# Patient Record
Sex: MALE | Race: AMERICAN INDIAN OR ALASKA NATIVE
[De-identification: names, ages, dates, MRNs, and addresses within clinical notes are randomized per-mention and may not be internally consistent; named-entity substitution may affect disease eponyms.]

---

## 2021-04-27 ENCOUNTER — HOSPITAL ENCOUNTER (EMERGENCY)
Dept: HOSPITAL 5 - ED | Age: 26
Discharge: HOME | End: 2021-04-27
Payer: SELF-PAY

## 2021-04-27 VITALS — DIASTOLIC BLOOD PRESSURE: 69 MMHG | SYSTOLIC BLOOD PRESSURE: 106 MMHG

## 2021-04-27 DIAGNOSIS — J03.90: Primary | ICD-10-CM

## 2021-04-27 DIAGNOSIS — Z79.899: ICD-10-CM

## 2021-04-27 DIAGNOSIS — F17.200: ICD-10-CM

## 2021-04-27 PROCEDURE — 99282 EMERGENCY DEPT VISIT SF MDM: CPT

## 2021-04-27 NOTE — EMERGENCY DEPARTMENT REPORT
ED ENT HPI





- General


Chief complaint: Sore Throat


Stated complaint: DELANEY, SORE THROAT


Time Seen by Provider: 04/27/21 16:13


Source: patient


Mode of arrival: Ambulatory


Limitations: No Limitations





- History of Present Illness


Initial comments: 





Patient is a 25-year-old male who presents emergency room with complaints of a 

sore throat that began a week ago.  He states in the last 2 days it has become 

more uncomfortable and hurts to swallow.  He states he is able to tolerate p.o. 

intake.  He states that his tonsils feel swollen and he noticed some white 

spots.  He states he also has right ear pain.  He denies any fever, nausea, 

vomiting, diarrhea, ear drainage, hearing changes, shortness of breath.  Past 

medical history of HIV and reports he is undetectable.  No allergies to 

medications.  No known sick contacts.  No recent travel.





- Related Data


                                  Previous Rx's











 Medication  Instructions  Recorded  Last Taken  Type


 


Nystas/Diphen/Xyl Visc/Mylanta 30 ml MM Q4H PRN #300 ml 04/27/21 Unknown Rx





[Magic Mouthwash]    


 


Penicillin Vk [Veetids TAB] 500 mg PO BID 10 Days #40 tablet 04/27/21 Unknown Rx











                                    Allergies











Allergy/AdvReac Type Severity Reaction Status Date / Time


 


No Known Allergies Allergy   Verified 04/27/21 15:18














ED Dental HPI





- General


Chief complaint: Sore Throat


Stated complaint: DELANEY, SORE THROAT


Time Seen by Provider: 04/27/21 16:13


Source: patient


Mode of arrival: Ambulatory


Limitations: No Limitations





- Related Data


                                  Previous Rx's











 Medication  Instructions  Recorded  Last Taken  Type


 


Nystas/Diphen/Xyl Visc/Mylanta 30 ml MM Q4H PRN #300 ml 04/27/21 Unknown Rx





[Magic Mouthwash]    


 


Penicillin Vk [Veetids TAB] 500 mg PO BID 10 Days #40 tablet 04/27/21 Unknown Rx











                                    Allergies











Allergy/AdvReac Type Severity Reaction Status Date / Time


 


No Known Allergies Allergy   Verified 04/27/21 15:18














ED Review of Systems


ROS: 


Stated complaint: DELANEY, SORE THROAT


Other details as noted in HPI





Comment: All other systems reviewed and negative





ED Past Medical Hx





- Past Medical History


Previous Medical History?: No





- Surgical History


Additional Surgical History: L arm sx





- Social History


Smoking Status: Current Every Day Smoker


Substance Use Type: None





- Medications


Home Medications: 


                                Home Medications











 Medication  Instructions  Recorded  Confirmed  Last Taken  Type


 


Nystas/Diphen/Xyl Visc/Mylanta 30 ml MM Q4H PRN #300 ml 04/27/21  Unknown Rx





[Magic Mouthwash]     


 


Penicillin Vk [Veetids TAB] 500 mg PO BID 10 Days #40 tablet 04/27/21  Unknown 

Rx














ED Physical Exam





- General


Limitations: No Limitations


General appearance: alert, in no apparent distress





- Head


Head exam: Present: atraumatic, normocephalic





- Eye


Eye exam: Present: normal appearance





- ENT


ENT exam: Present: mucous membranes moist, TM's normal bilaterally, normal 

external ear exam, other (tonsillar hypertrophy with small amount of exudates 

bilaterally, uvula is midline, no uvular edema or deviation, no trismus, no 

muffled voice)





- Respiratory


Respiratory exam: Present: normal lung sounds bilaterally.  Absent: respiratory 

distress, wheezes, rales, rhonchi, stridor, chest wall tenderness, accessory 

muscle use, decreased breath sounds, prolonged expiratory





- Cardiovascular


Cardiovascular Exam: Present: normal rhythm, tachycardia (mildly), normal heart 

sounds.  Absent: systolic murmur, diastolic murmur, rubs, gallop





- Neurological Exam


Neurological exam: Present: alert, oriented X3





- Psychiatric


Psychiatric exam: Present: normal affect, normal mood





- Skin


Skin exam: Present: warm, dry, intact





ED Course


                                   Vital Signs











  04/27/21 04/27/21





  15:18 16:44


 


Temperature 99.3 F 


 


Pulse Rate 110 H 89


 


Respiratory 18 





Rate  


 


Blood Pressure 106/69 


 


O2 Sat by Pulse 99 





Oximetry  














ED Medical Decision Making





- Lab Data








                                   Vital Signs











  04/27/21 04/27/21





  15:18 16:44


 


Temperature 99.3 F 


 


Pulse Rate 110 H 89


 


Respiratory 18 





Rate  


 


Blood Pressure 106/69 


 


O2 Sat by Pulse 99 





Oximetry  














- Medical Decision Making





Patient is a 25-year-old male who presents emergency room with complaints of a 

sore throat that began a week ago.  He states in the last 2 days it has become 

more uncomfortable and hurts to swallow.  He states he is able to tolerate p.o. 

intake.  He states that his tonsils feel swollen and he noticed some white 

spots.  He states he also has right ear pain.  He denies any fever, nausea, 

vomiting, diarrhea, ear drainage, hearing changes, shortness of breath.  Past 

medical history of HIV and reports he is undetectable.  No allergies to 

medications.  No known sick contacts.  No recent travel.  Initial triage vitals 

with tachycardia which improved upon repeat.  On exam:tonsillar hypertrophy with

small amount of exudates bilaterally, uvula is midline, no uvular edema or 

deviation, no trismus, no muffled voice.  Examination appears consistent with 

tonsillitis.  Patient given prescription for penicillin VK and Magic mouthwash. 

Advised patient Please take medication as prescribed.  Increase your fluid 

intake.  May take Tylenol or ibuprofen as needed for discomfort.  Gargle with 

warm salt water.  After 24 hours on antibiotics throw away your toothbrush.  Do 

not drink after others or allow anyone to drink after you.  Follow-up with your 

primary care doctor for reexamination and to make sure symptoms are improving.  

Return to emergency room for any new or worsening symptoms.


Critical care attestation.: 


If time is entered above; I have spent that time in minutes in the direct care 

of this critically ill patient, excluding procedure time.








ED Disposition


Clinical Impression: 


 Tonsillitis





Disposition: DC-01 TO HOME OR SELFCARE


Is pt being admited?: No


Does the pt Need Aspirin: No


Condition: Stable


Instructions:  Tonsillitis, Easy-to-Read


Additional Instructions: 


Please take medication as prescribed.  Increase your fluid intake.  May take 

Tylenol or ibuprofen as needed for discomfort.  Gargle with warm salt water.  

After 24 hours on antibiotics throw away your toothbrush.  Do not drink after 

others or allow anyone to drink after you.  Follow-up with your primary care 

doctor for reexamination and to make sure symptoms are improving.  Return to 

emergency room for any new or worsening symptoms.


Prescriptions: 


Nystas/Diphen/Xyl Visc/Mylanta [Magic Mouthwash] 30 ml MM Q4H PRN #300 ml


 PRN Reason: sore throat


Penicillin Vk [Veetids TAB] 500 mg PO BID 10 Days #40 tablet


Referrals: 


LETI DAWN MD [Staff Physician] - 3-5 Days


[facility] [Provider Group] - 3-5 Days


[facility] [Outside] - 3-5 Days


[facility], [LAB/CONTRACT] - 3-5 Days


Time of Disposition: 16:22


Print Language: ENGLISH

## 2022-08-17 ENCOUNTER — HOSPITAL ENCOUNTER (EMERGENCY)
Dept: HOSPITAL 5 - ED | Age: 27
Discharge: HOME | End: 2022-08-17
Payer: SELF-PAY

## 2022-08-17 VITALS — SYSTOLIC BLOOD PRESSURE: 103 MMHG | DIASTOLIC BLOOD PRESSURE: 78 MMHG

## 2022-08-17 DIAGNOSIS — F17.200: ICD-10-CM

## 2022-08-17 DIAGNOSIS — Z79.899: ICD-10-CM

## 2022-08-17 DIAGNOSIS — K13.70: ICD-10-CM

## 2022-08-17 DIAGNOSIS — N50.89: Primary | ICD-10-CM

## 2022-08-17 PROCEDURE — 99282 EMERGENCY DEPT VISIT SF MDM: CPT

## 2022-08-17 NOTE — EMERGENCY DEPARTMENT REPORT
- General


Chief complaint: Skin/Abscess/Foreign Body


Stated complaint: SORE IN MOUTH


Time Seen by Provider: 08/17/22 13:39


Source: patient


Mode of arrival: Ambulatory


Limitations: No Limitations





- History of Present Illness


Initial comments: 


27-year-old male history of HIV on ART's with therapeutic levels presents to the

emergency department with genital and oral sore.  Patient reports he has noticed

a sore in his groin area and has mild first





No penile discharge, no dysuria, no nausea vomiting abdominal pain, no fever or 

chills.  No hematuria, no flank pain, no weakness dizziness chest pain or 

shortness of breath.


Sexually active male with male partners with and without condom use.








- Related Data


                                  Previous Rx's











 Medication  Instructions  Recorded  Last Taken  Type


 


Penicillin Vk [Veetids TAB] 500 mg PO BID 10 Days #40 tablet 04/27/21 Unknown Rx


 


Acyclovir 400 mg PO TID 10 Days #30 08/17/22 Unknown Rx


 


Nystas/Diphen/Xyl Visc/Mylanta 30 ml MM Q4H PRN #300 ml 08/17/22 Unknown Rx





[Magic Mouthwash]    











                                    Allergies











Allergy/AdvReac Type Severity Reaction Status Date / Time


 


No Known Allergies Allergy   Verified 08/17/22 12:47














Abscess Boil HPI





- HPI


Chief Complaint: Skin/Abscess/Foreign Body


Stated Complaint: SORE IN MOUTH


Time Seen by Provider: 08/17/22 13:39


Home Medications: 


                                  Previous Rx's











 Medication  Instructions  Recorded  Last Taken  Type


 


Penicillin Vk [Veetids TAB] 500 mg PO BID 10 Days #40 tablet 04/27/21 Unknown Rx


 


Acyclovir 400 mg PO TID 10 Days #30 08/17/22 Unknown Rx


 


Nystas/Diphen/Xyl Visc/Mylanta 30 ml MM Q4H PRN #300 ml 08/17/22 Unknown Rx





[Magic Mouthwash]    











Allergies/Adverse Reactions: 


                                    Allergies











Allergy/AdvReac Type Severity Reaction Status Date / Time


 


No Known Allergies Allergy   Verified 08/17/22 12:47














ED Review of Systems


ROS: 


Stated complaint: SORE IN MOUTH


Other details as noted in HPI





Constitutional: no symptoms reported


ENT: denies: throat pain


Respiratory: denies: cough, orthopnea


Cardiovascular: denies: chest pain, palpitations


Endocrine: denies: excessive sweating, intolerance to cold


Genitourinary: denies: urgency, dysuria, frequency, hematuria


Musculoskeletal: denies: back pain, joint swelling


Skin: lesions, change in color.  denies: rash


Neurological: denies: headache, weakness, numbness, paresthesias, confusion


Hematological/Lymphatic: swollen glands.  denies: easy bleeding, easy bruising





ED Past Medical Hx





- Past Medical History


Previous Medical History?: Yes


Additional medical history: HIV





- Surgical History


Past Surgical History?: No


Additional Surgical History: L arm sx





- Social History


Smoking Status: Current Every Day Smoker


Substance Use Type: None





- Medications


Home Medications: 


                                Home Medications











 Medication  Instructions  Recorded  Confirmed  Last Taken  Type


 


Penicillin Vk [Veetids TAB] 500 mg PO BID 10 Days #40 tablet 04/27/21  Unknown 

Rx


 


Acyclovir 400 mg PO TID 10 Days #30 08/17/22  Unknown Rx


 


Nystas/Diphen/Xyl Visc/Mylanta 30 ml MM Q4H PRN #300 ml 08/17/22  Unknown Rx





[Magic Mouthwash]     














ED Physical Exam





- General


Limitations: No Limitations


General appearance: alert, in no apparent distress





- Head


Head exam: Present: atraumatic





- Eye


Eye exam: Present: normal appearance





- ENT


ENT exam: Present: mucous membranes moist, other (1 ulcerated lesion under his 

tongue)





- Neck


Neck exam: Present: normal inspection.  Absent: tenderness





- Respiratory


Respiratory exam: Present: normal lung sounds bilaterally





- Cardiovascular


Cardiovascular Exam: Present: regular rate





- GI/Abdominal


GI/Abdominal exam: Present: soft.  Absent: distended, tenderness, guarding





- Rectal


Rectal exam: Present: deferred





- 


 exam: Present: other (1 ulcerated lesion at the base of his penis, tender no 

fluctuance no induration no erythema).  Absent: testicular tenderness, urethral 

discharge, scrotal swelling





- Extremities Exam


Extremities exam: Present: normal inspection, full ROM





- Back Exam


Back exam: Present: normal inspection, full ROM





- Neurological Exam


Neurological exam: Present: alert, oriented X3, CN II-XII intact





- Psychiatric


Psychiatric exam: Present: normal affect, normal mood





ED Course





                                   Vital Signs











  08/17/22





  12:48


 


Temperature 99.8 F H


 


Pulse Rate 94 H


 


Respiratory 18





Rate 


 


Blood Pressure 103/78





[Right] 


 


O2 Sat by Pulse 100





Oximetry 














ED Medical Decision Making





- Medical Decision Making


27-year-old male history of HIV on antiviral therapy presenting with genital and

 oral sore.  No fever, no dysuria no nausea vomiting abdominal pain no behavior 

changes, he denies fever however patient has a low-grade temperature in triage, 

exam is concerning for chancroid sore, coupled with the setting of HIV, will 

treat with ceftriaxone, azithromycin, and prescription for acyclovir.


Discharge patient to the health department for further testing including 

syphilis.including avoid spread of infection to others abstain from sex until 

treatment is completed and no longer infectious, condom use.





Patient also has 1 solitary rash in his medial thigh "concerned it might be 

monkey pox", I have also referred him to the health department for further 

testing.








Patient remained stable nontoxic-appearing, afebrile, ambulating steadily 

without assistance.  Gone over ED findings with patient as well as plan for 

follow-up.  Also discussed return precautions with patient, all questions and 

concerns addressed.  Patient is stable to be discharged follow-up outpatient.


Audio voice dictation device used, hence the chart might contain some dictation 

errors, mispronunciations, wrong spelling and wrong verbiage.


Critical care attestation.: 


If time is entered above; I have spent that time in minutes in the direct care 

of this critically ill patient, excluding procedure time.








ED Disposition


Clinical Impression: 


 Genital lesion, male, Oral lesion





Disposition: 01 HOME / SELF CARE / HOMELESS


Is pt being admited?: No


Does the pt Need Aspirin: No


Condition: Stable


Instructions:  Safe Sex, Genital Herpes


Additional Instructions: 


Be sure to follow-up with the health department of we have discussed


Make sure no sexual activity 1 2 you have been seen and cleared and your 

symptoms have resolved


Do not hesitate to return to the emergency department if fever vomiting weakness

 fatigue body ache difficulty urinating or any other concerning symptoms


Prescriptions: 


Acyclovir 400 mg PO TID 10 Days #30


Nystas/Diphen/Xyl Visc/Mylanta [Magic Mouthwash] 30 ml MM Q4H PRN #300 ml


 PRN Reason: sore throat


Referrals: 


[facility] [Outside] - 3-5 Days